# Patient Record
Sex: FEMALE | Race: WHITE | NOT HISPANIC OR LATINO | URBAN - NONMETROPOLITAN AREA
[De-identification: names, ages, dates, MRNs, and addresses within clinical notes are randomized per-mention and may not be internally consistent; named-entity substitution may affect disease eponyms.]

---

## 2024-01-04 ENCOUNTER — OFFICE VISIT (OUTPATIENT)
Dept: URBAN - NONMETROPOLITAN AREA CLINIC 2 | Facility: CLINIC | Age: 64
End: 2024-01-04

## 2024-04-15 ENCOUNTER — OV NP (OUTPATIENT)
Dept: URBAN - NONMETROPOLITAN AREA CLINIC 2 | Facility: CLINIC | Age: 64
End: 2024-04-15

## 2024-08-15 ENCOUNTER — DASHBOARD ENCOUNTERS (OUTPATIENT)
Age: 64
End: 2024-08-15

## 2024-08-15 ENCOUNTER — LAB OUTSIDE AN ENCOUNTER (OUTPATIENT)
Dept: URBAN - NONMETROPOLITAN AREA CLINIC 2 | Facility: CLINIC | Age: 64
End: 2024-08-15

## 2024-08-15 ENCOUNTER — OFFICE VISIT (OUTPATIENT)
Dept: URBAN - NONMETROPOLITAN AREA CLINIC 2 | Facility: CLINIC | Age: 64
End: 2024-08-15
Payer: MEDICARE

## 2024-08-15 VITALS
DIASTOLIC BLOOD PRESSURE: 76 MMHG | TEMPERATURE: 98 F | BODY MASS INDEX: 26.66 KG/M2 | HEART RATE: 84 BPM | WEIGHT: 160 LBS | HEIGHT: 65 IN | SYSTOLIC BLOOD PRESSURE: 126 MMHG

## 2024-08-15 DIAGNOSIS — R19.8 ALTERNATING CONSTIPATION AND DIARRHEA: ICD-10-CM

## 2024-08-15 DIAGNOSIS — K76.0 FATTY LIVER: ICD-10-CM

## 2024-08-15 DIAGNOSIS — R74.8 ABNORMAL LIVER ENZYMES: ICD-10-CM

## 2024-08-15 DIAGNOSIS — R13.19 ESOPHAGEAL DYSPHAGIA: ICD-10-CM

## 2024-08-15 PROBLEM — 51615001: Status: ACTIVE | Noted: 2024-08-15

## 2024-08-15 PROBLEM — 40890009: Status: ACTIVE | Noted: 2024-08-15

## 2024-08-15 PROBLEM — 249517009: Status: ACTIVE | Noted: 2024-08-15

## 2024-08-15 PROCEDURE — 99214 OFFICE O/P EST MOD 30 MIN: CPT | Performed by: NURSE PRACTITIONER

## 2024-08-15 RX ORDER — PANTOPRAZOLE SODIUM 20 MG/1
1 TABLET TABLET, DELAYED RELEASE ORAL ONCE A DAY
Qty: 90 TABLET | Refills: 3 | OUTPATIENT
Start: 2024-08-15

## 2024-08-15 NOTE — HPI-TODAY'S VISIT:
8/15/2024 Haylie presents for evaluation of dysphagia and personal history of colon polyps.  Since her last visit she has had worsening dysphagia to solids.  She was diagnosed with Sjogren's and is following with Dr. Gallegos.  She has a history of elevated liver enzymes with fatty liver on imaging, her repeat LFTs have been normal.  Her ANDREA and her ferritin were elevated, her IgG and her HFE genetics were normal.  Her last colonoscopy was in 2015, this was done by Dr. Alarcon.  She had a colonoscopy in 2011 by Dr. Lozada with 4 hyperplastic polyps.  She is due for repeat colonoscopy.  Her bowels alternate between constipation and diarrhea.  Today she agrees to start pantoprazole 20 mg daily for her dysphagia will schedule EGD and esophagram, she does likely have a component of crest.  She is due for colonoscopy, we will set schedule colonoscopy as well.  Would start low-dose psyllium.  Her LFTs are normal in July of this year with her PCP. MB

## 2024-11-11 ENCOUNTER — TELEPHONE ENCOUNTER (OUTPATIENT)
Dept: URBAN - NONMETROPOLITAN AREA CLINIC 2 | Facility: CLINIC | Age: 64
End: 2024-11-11

## 2024-11-25 ENCOUNTER — LAB OUTSIDE AN ENCOUNTER (OUTPATIENT)
Dept: URBAN - NONMETROPOLITAN AREA CLINIC 2 | Facility: CLINIC | Age: 64
End: 2024-11-25

## 2024-11-25 ENCOUNTER — OFFICE VISIT (OUTPATIENT)
Dept: URBAN - METROPOLITAN AREA MEDICAL CENTER 1 | Facility: MEDICAL CENTER | Age: 64
End: 2024-11-25

## 2024-11-25 RX ORDER — PANTOPRAZOLE SODIUM 20 MG/1
1 TABLET TABLET, DELAYED RELEASE ORAL ONCE A DAY
Qty: 90 TABLET | Refills: 3 | Status: ACTIVE | COMMUNITY
Start: 2024-08-15

## 2024-11-26 LAB
AP CASE REPORT: (no result)
AP FINAL DIAGNOSIS: (no result)
AP GROSS DESCRIPTION: (no result)
AP MICROSCOPIC DESCRIPTION: (no result)

## 2025-01-30 ENCOUNTER — LAB OUTSIDE AN ENCOUNTER (OUTPATIENT)
Dept: URBAN - NONMETROPOLITAN AREA CLINIC 2 | Facility: CLINIC | Age: 65
End: 2025-01-30

## 2025-01-30 ENCOUNTER — OFFICE VISIT (OUTPATIENT)
Dept: URBAN - METROPOLITAN AREA MEDICAL CENTER 1 | Facility: MEDICAL CENTER | Age: 65
End: 2025-01-30

## 2025-01-30 RX ORDER — PANTOPRAZOLE SODIUM 20 MG/1
1 TABLET TABLET, DELAYED RELEASE ORAL ONCE A DAY
Qty: 90 TABLET | Refills: 3 | Status: ACTIVE | COMMUNITY
Start: 2024-08-15

## 2025-03-07 ENCOUNTER — OFFICE VISIT (OUTPATIENT)
Dept: URBAN - NONMETROPOLITAN AREA CLINIC 2 | Facility: CLINIC | Age: 65
End: 2025-03-07

## 2025-06-09 ENCOUNTER — OFFICE VISIT (OUTPATIENT)
Dept: URBAN - NONMETROPOLITAN AREA CLINIC 2 | Facility: CLINIC | Age: 65
End: 2025-06-09
Payer: COMMERCIAL

## 2025-06-09 DIAGNOSIS — R19.8 ALTERNATING CONSTIPATION AND DIARRHEA: ICD-10-CM

## 2025-06-09 DIAGNOSIS — J84.10 INTERSTITIAL PULMONARY FIBROSIS: ICD-10-CM

## 2025-06-09 DIAGNOSIS — R74.8 ABNORMAL LIVER ENZYMES: ICD-10-CM

## 2025-06-09 DIAGNOSIS — K76.0 FATTY LIVER: ICD-10-CM

## 2025-06-09 DIAGNOSIS — K57.90 DIVERTICULOSIS: ICD-10-CM

## 2025-06-09 DIAGNOSIS — R13.19 ESOPHAGEAL DYSPHAGIA: ICD-10-CM

## 2025-06-09 DIAGNOSIS — K22.5 ZENKER'S DIVERTICULUM: ICD-10-CM

## 2025-06-09 DIAGNOSIS — Z12.11 COLON CANCER SCREENING: ICD-10-CM

## 2025-06-09 PROBLEM — 399291005: Status: ACTIVE | Noted: 2025-06-09

## 2025-06-09 PROBLEM — 397881000: Status: ACTIVE | Noted: 2025-06-09

## 2025-06-09 PROCEDURE — 99214 OFFICE O/P EST MOD 30 MIN: CPT | Performed by: NURSE PRACTITIONER

## 2025-06-09 RX ORDER — PANTOPRAZOLE SODIUM 20 MG/1
1 TABLET TABLET, DELAYED RELEASE ORAL ONCE A DAY
Qty: 90 TABLET | Refills: 3
Start: 2024-08-15

## 2025-06-09 RX ORDER — PANTOPRAZOLE SODIUM 20 MG/1
1 TABLET TABLET, DELAYED RELEASE ORAL ONCE A DAY
Qty: 90 TABLET | Refills: 3 | Status: ACTIVE | COMMUNITY
Start: 2024-08-15

## 2025-06-09 NOTE — HPI-TODAY'S VISIT:
8/15/2024 Haylie presents for evaluation of dysphagia and personal history of colon polyps.  Since her last visit she has had worsening dysphagia to solids.  She was diagnosed with Sjogren's and is following with Dr. Gallegos.  She has a history of elevated liver enzymes with fatty liver on imaging, her repeat LFTs have been normal.  Her ANDREA and her ferritin were elevated, her IgG and her HFE genetics were normal.  Her last colonoscopy was in 2015, this was done by Dr. Alarcon.  She had a colonoscopy in 2011 by Dr. Lozada with 4 hyperplastic polyps.  She is due for repeat colonoscopy.  Her bowels alternate between constipation and diarrhea.  Today she agrees to start pantoprazole 20 mg daily for her dysphagia will schedule EGD and esophagram, she does likely have a component of crest.  She is due for colonoscopy, we will set schedule colonoscopy as well.  Would start low-dose psyllium.  Her LFTs are normal in July of this year with her PCP. MB 6/9/2025 Haylie presents for colonoscopy follow-up, since her last visit her colonoscopy reveals diverticular disease otherwise normal, she did have a Juul in her colon that was removed.  Her bowels are moving fairly regularly, she is compliant with her psyllium.  Her swallowing has been stable, she does have intermittent mild episodes of dysphagia, she is taking her pantoprazole 20 mg daily.  She does not feel like she needs a dilation at this time.  She will let us know if this changes.  She is due for repeat labs with rheumatology we will add hepatic function panel to follow-up on her liver enzymes and history of fatty liver.  Today she denies any new GI complaints.  MB

## 2025-07-24 ENCOUNTER — LAB OUTSIDE AN ENCOUNTER (OUTPATIENT)
Dept: URBAN - NONMETROPOLITAN AREA CLINIC 2 | Facility: CLINIC | Age: 65
End: 2025-07-24

## 2025-07-24 LAB
A/G RATIO: 1.3
ALBUMIN: 4.6
ALKALINE PHOSPHATASE: 76
ALT (SGPT): 16
AST (SGOT): 20
BILIRUBIN DIRECT: 0.1
BILIRUBIN TOTAL: 0.5
BILIRUBIN, INDIRECT: 0.4
PROTEIN TOTAL: 8.1